# Patient Record
Sex: FEMALE | Race: WHITE
[De-identification: names, ages, dates, MRNs, and addresses within clinical notes are randomized per-mention and may not be internally consistent; named-entity substitution may affect disease eponyms.]

---

## 2019-05-31 ENCOUNTER — HOSPITAL ENCOUNTER (EMERGENCY)
Dept: HOSPITAL 53 - M ED | Age: 34
LOS: 1 days | Discharge: HOME | End: 2019-06-01
Payer: COMMERCIAL

## 2019-05-31 VITALS — SYSTOLIC BLOOD PRESSURE: 133 MMHG | DIASTOLIC BLOOD PRESSURE: 69 MMHG

## 2019-05-31 DIAGNOSIS — Z88.0: ICD-10-CM

## 2019-05-31 DIAGNOSIS — I88.0: ICD-10-CM

## 2019-05-31 DIAGNOSIS — K52.9: Primary | ICD-10-CM

## 2019-05-31 DIAGNOSIS — F41.9: ICD-10-CM

## 2019-05-31 LAB
ALBUMIN SERPL BCG-MCNC: 3.1 GM/DL (ref 3.2–5.2)
ALT SERPL W P-5'-P-CCNC: 17 U/L (ref 12–78)
BASOPHILS # BLD AUTO: 0 10^3/UL (ref 0–0.2)
BASOPHILS NFR BLD AUTO: 0.1 % (ref 0–1)
BILIRUB CONJ SERPL-MCNC: 0.1 MG/DL (ref 0–0.2)
BILIRUB SERPL-MCNC: 0.4 MG/DL (ref 0.2–1)
EOSINOPHIL # BLD AUTO: 0 10^3/UL (ref 0–0.5)
EOSINOPHIL NFR BLD AUTO: 0.4 % (ref 0–3)
HCT VFR BLD AUTO: 38.4 % (ref 36–47)
HGB BLD-MCNC: 12.8 G/DL (ref 12–15.5)
LIPASE SERPL-CCNC: 124 U/L (ref 73–393)
LYMPHOCYTES # BLD AUTO: 0.7 10^3/UL (ref 1.5–4.5)
LYMPHOCYTES NFR BLD AUTO: 7.6 % (ref 24–44)
MCH RBC QN AUTO: 30.3 PG (ref 27–33)
MCHC RBC AUTO-ENTMCNC: 33.3 G/DL (ref 32–36.5)
MCV RBC AUTO: 91 FL (ref 80–96)
MONOCYTES # BLD AUTO: 0.6 10^3/UL (ref 0–0.8)
MONOCYTES NFR BLD AUTO: 5.9 % (ref 0–5)
NEUTROPHILS # BLD AUTO: 8.1 10^3/UL (ref 1.8–7.7)
NEUTROPHILS NFR BLD AUTO: 85.7 % (ref 36–66)
PLATELET # BLD AUTO: 235 10^3/UL (ref 150–450)
PROT SERPL-MCNC: 6.2 GM/DL (ref 6.4–8.2)
RBC # BLD AUTO: 4.22 10^6/UL (ref 4–5.4)
WBC # BLD AUTO: 9.4 10^3/UL (ref 4–10)

## 2019-05-31 PROCEDURE — 83605 ASSAY OF LACTIC ACID: CPT

## 2019-05-31 PROCEDURE — 83690 ASSAY OF LIPASE: CPT

## 2019-05-31 PROCEDURE — 74177 CT ABD & PELVIS W/CONTRAST: CPT

## 2019-05-31 PROCEDURE — 80076 HEPATIC FUNCTION PANEL: CPT

## 2019-05-31 PROCEDURE — 99284 EMERGENCY DEPT VISIT MOD MDM: CPT

## 2019-05-31 PROCEDURE — 96374 THER/PROPH/DIAG INJ IV PUSH: CPT

## 2019-05-31 PROCEDURE — 84702 CHORIONIC GONADOTROPIN TEST: CPT

## 2019-05-31 PROCEDURE — 80047 BASIC METABLC PNL IONIZED CA: CPT

## 2019-05-31 PROCEDURE — 93005 ELECTROCARDIOGRAM TRACING: CPT

## 2019-05-31 PROCEDURE — 85025 COMPLETE CBC W/AUTO DIFF WBC: CPT

## 2019-05-31 PROCEDURE — 93041 RHYTHM ECG TRACING: CPT

## 2019-05-31 PROCEDURE — 36415 COLL VENOUS BLD VENIPUNCTURE: CPT

## 2019-05-31 NOTE — ECGEPIP
[facility] - ED

                                       

                                       Test Date:    2019

Pat Name:     DONITA MANZANO            Department:   

Patient ID:   [id number]                 Room:         -

Gender:       Female                   Technician:   

:          1985               Requested By: ESTRELLA GUERRERO

Order Number: [id number]     Reading MD:   Sudhakar Eldridge

                                 Measurements

Intervals                              Axis          

Rate:         92                       P:            63

MA:           148                      QRS:          67

QRSD:         93                       T:            52

QT:           364                                    

QTc:          452                                    

                           Interpretive Statements

SINUS RHYTHM

NONSPECIFIC T-WAVE ABNORMALITY

NO PRIORS FOR COMPARISON

Electronically Signed on 2019 22:38:39 EDT by Sudhakar Eldridge

## 2019-06-01 NOTE — REPVR
EXAM: 

 CT Abdomen and Pelvis With Contrast 



EXAM DATE/TIME: 

 5/31/2019 9:55 PM 



CLINICAL HISTORY: 

 34 years old, female; Abdominal pain; Localized; Left lower quadrant (llq) 



TECHNIQUE: 

 Imaging protocol: Axial computed tomography images of the abdomen and pelvis 

with intravenous contrast. Coronal and sagittal reformatted images were created 

and reviewed. 

 Radiation optimization: All CT scans at this facility use at least one of 

these dose optimization techniques: automated exposure control; mA and/or kV 

adjustment per patient size (includes targeted exams where dose is matched to 

clinical indication); or iterative reconstruction. 

 Contrast material: ISO; Contrast volume: 100 ml; Contrast route: AC; 



COMPARISON: 

 No relevant prior studies available. 



FINDINGS: 



ABDOMEN: 

 Liver: Mild hepatomegaly. 

 Gallbladder and bile ducts: Normal. No calcified stones. No ductal dilation. 

 Pancreas: Normal. No ductal dilation. 

 Spleen: Normal. No splenomegaly. 

 Adrenals: Normal. No mass. 

 Kidneys and ureters: Normal. No hydronephrosis. 

 Stomach and bowel: Normal. No obstruction. No mucosal thickening. 

 Appendix: No evidence of appendicitis. 



PELVIS: 

 Bladder: Unremarkable as visualized. 

 Reproductive: Retroverted uterus. 



ABDOMEN and PELVIS: 

 Intraperitoneal space: Normal. No free air. No significant fluid collection. 

 Bones/joints: No acute fracture. No dislocation. 

 Soft tissues: Fat-containing umbilical hernia. 

 Vasculature: Normal. No abdominal aortic aneurysm. 

 Lymph nodes: Multiple borderline enlarged mesenteric lymph nodes with 

associated stranding of the mesenteric root fat. 



IMPRESSION: 

Mild hepatomegaly.



No hydronephrosis or nephrolithiasis bilaterally.



No bowel obstruction.  Normal appendix.



Multiple borderline enlarged mesenteric lymph nodes with associated stranding 

of the mesenteric root fat.  Mesenteric panniculitis is considered.



Electronically signed by: Jamal Cheng On 06/01/2019  00:38:18 AM

## 2020-10-15 ENCOUNTER — HOSPITAL ENCOUNTER (EMERGENCY)
Dept: HOSPITAL 53 - M ED | Age: 35
Discharge: HOME | End: 2020-10-15
Payer: COMMERCIAL

## 2020-10-15 VITALS — SYSTOLIC BLOOD PRESSURE: 121 MMHG | DIASTOLIC BLOOD PRESSURE: 69 MMHG

## 2020-10-15 VITALS — HEIGHT: 70 IN | WEIGHT: 180.38 LBS | BODY MASS INDEX: 25.82 KG/M2

## 2020-10-15 DIAGNOSIS — K58.9: ICD-10-CM

## 2020-10-15 DIAGNOSIS — K59.00: Primary | ICD-10-CM

## 2020-10-15 DIAGNOSIS — Z79.899: ICD-10-CM

## 2020-10-15 LAB
ALBUMIN SERPL BCG-MCNC: 3.6 GM/DL (ref 3.2–5.2)
ALT SERPL W P-5'-P-CCNC: 22 U/L (ref 12–78)
BASOPHILS # BLD AUTO: 0 10^3/UL (ref 0–0.2)
BASOPHILS NFR BLD AUTO: 0.1 % (ref 0–1)
BILIRUB CONJ SERPL-MCNC: 0.1 MG/DL (ref 0–0.2)
BILIRUB SERPL-MCNC: 0.3 MG/DL (ref 0.2–1)
EOSINOPHIL # BLD AUTO: 0.2 10^3/UL (ref 0–0.5)
EOSINOPHIL NFR BLD AUTO: 1.4 % (ref 0–3)
HCT VFR BLD AUTO: 39.9 % (ref 36–47)
HGB BLD-MCNC: 13.2 G/DL (ref 12–15.5)
LIPASE SERPL-CCNC: 185 U/L (ref 73–393)
LYMPHOCYTES # BLD AUTO: 1.2 10^3/UL (ref 1.5–5)
LYMPHOCYTES NFR BLD AUTO: 8.1 % (ref 24–44)
MCH RBC QN AUTO: 29.4 PG (ref 27–33)
MCHC RBC AUTO-ENTMCNC: 33.1 G/DL (ref 32–36.5)
MCV RBC AUTO: 88.9 FL (ref 80–96)
MONOCYTES # BLD AUTO: 0.9 10^3/UL (ref 0–0.8)
MONOCYTES NFR BLD AUTO: 6 % (ref 0–5)
NEUTROPHILS # BLD AUTO: 12.2 10^3/UL (ref 1.5–8.5)
NEUTROPHILS NFR BLD AUTO: 83.9 % (ref 36–66)
PLATELET # BLD AUTO: 278 10^3/UL (ref 150–450)
PROT SERPL-MCNC: 6.7 GM/DL (ref 6.4–8.2)
RBC # BLD AUTO: 4.49 10^6/UL (ref 4–5.4)
WBC # BLD AUTO: 14.6 10^3/UL (ref 4–10)

## 2020-10-15 PROCEDURE — 80076 HEPATIC FUNCTION PANEL: CPT

## 2020-10-15 PROCEDURE — 80047 BASIC METABLC PNL IONIZED CA: CPT

## 2020-10-15 PROCEDURE — 83690 ASSAY OF LIPASE: CPT

## 2020-10-15 PROCEDURE — 84702 CHORIONIC GONADOTROPIN TEST: CPT

## 2020-10-15 PROCEDURE — 74177 CT ABD & PELVIS W/CONTRAST: CPT

## 2020-10-15 PROCEDURE — 93041 RHYTHM ECG TRACING: CPT

## 2020-10-15 PROCEDURE — 85025 COMPLETE CBC W/AUTO DIFF WBC: CPT

## 2020-10-15 PROCEDURE — 99285 EMERGENCY DEPT VISIT HI MDM: CPT

## 2020-10-15 PROCEDURE — 81001 URINALYSIS AUTO W/SCOPE: CPT

## 2020-10-15 NOTE — REPVR
PROCEDURE INFORMATION: 

Exam: CT Abdomen And Pelvis With Contrast 

Exam date and time: 10/15/2020 4:00 AM 

Age: 35 years old 

Clinical indication: Abdominal pain; Localized; Lower; Additional info: Lower 

abd pain 



TECHNIQUE: 

Imaging protocol: Computed tomography of the abdomen and pelvis with 

intravenous contrast. 

Radiation optimization: All CT scans at this facility use at least one of these 

dose optimization techniques: automated exposure control; mA and/or kV 

adjustment per patient size (includes targeted exams where dose is matched to 

clinical indication); or iterative reconstruction. 

Contrast material: ISO; Contrast volume: 100 ml; Contrast route: INTRAVENOUS 

(IV);  



COMPARISON: 

CT ABD/PEL W/IV CONTRAST ONLY 5/31/2019 11:08 PM 



FINDINGS: 

Liver: Hepatomegaly. 

Gallbladder and bile ducts: Normal. No calcified stones. No ductal dilation. 

Pancreas: Normal. No ductal dilation. 

Spleen: Normal. No splenomegaly. 

Adrenals: Normal. No mass. 

Kidneys and ureters: Simple left renal cyst. Punctate nonobstructing right 

renal calculus.

Stomach and bowel: Moderate amount stool in the colon and rectum. 

Appendix: No evidence of appendicitis. 



Intraperitoneal space: Unremarkable. No free air. No significant fluid 

collection. 

Vasculature: Unremarkable. No abdominal aortic aneurysm. 

Lymph nodes: Mild nonspecific mesenteric adenopathy. 

Urinary bladder: Unremarkable as visualized. 

Reproductive: Unremarkable as visualized. 

Bones/joints: Unremarkable. No acute fracture. 

Soft tissues: Fat containing umbilical hernia. 



IMPRESSION: 

Moderate amount stool in the colon and rectum. Suspect constipation. 



Mild hepatomegaly.



Normal appendix.



No hydronephrosis. Punctate nonobstructing right renal calculus.



COMMENTS: 

Consistent with the [employer]'s Incidental Findings 

Committee white paper (J Am Cassandra Radiol 2018): Any incidental renal lesion less 

than 1 cm or classified as too small to characterize, or any incidental cystic 

renal lesion characterized as simple-appearing, is likely benign. No follow-up 

imaging is recommended for these lesions per consensus recommendations based on 

imaging criteria. 



Electronically signed by: Jamal Cheng On 10/15/2020  05:11:01 AM